# Patient Record
(demographics unavailable — no encounter records)

---

## 2025-02-12 NOTE — PHYSICAL EXAM
[FreeTextEntry1] : Healthy appearing 16-year-old child. Awake, alert, in no acute distress. Pleasant and cooperative.  Eyes are clear with no sclera abnormalities. External ears, nose and mouth are clear.  Good respiratory effort with no audible wheezing without use of a stethoscope. Ambulates independently with no evidence of antalgia. Good coordination and balance. Able to get on and off exam table without difficulty.   Examination of back: Right scapular prominence noted. very mild hip height discrepancy noted.   Neurological examination reveals a grade 5/5 muscle power. Sensation is intact to crude touch and pinprick. The plantars are bilaterally down going. The Sari test is negative.    There is no hairy patch, lipoma, sinus in the back. There is no pes cavus, asymmetry of calves, significant leg length discrepancy or significant cafe-au-lait spots.   Examination of both the upper and lower extremities: No obvious abnormalities. 5/5 muscle strength bilaterally. There is no gross deformity. Patient has full range of motion of both the hips, knees, ankles, wrists, elbows, and shoulders. Neck range of motion is full and free without any pain or spasm. Normal appearing fingers and toes. No large birthmarks noted. DTR's are intact.

## 2025-02-12 NOTE — DATA REVIEWED
[de-identified] : My interpretation and review of images taken today, 2/11/2025, in office:  AP/Lateral scoliosis x-rays OOB brace show demonstrates a right thoracic curve measuring 21.3 degrees. Thoracolumbar curve measuring 14 degrees. Relatively unchanged compared to OOB films Nov 2023. Risser 4+.  My interpretation and review of images taken today, 9/19/2024, in office:  AP/Lateral scoliosis x-rays OOB brace show demonstrates a right thoracic curve measuring 21.5 degrees. Thoracolumbar curve measuring 14 degrees. Relatively unchanged compared to OOB films Nov 2023. Risser 4+.  My interpretation and review of images taken today, 11/21/2023, in office: AP/Lateral scoliosis x-rays OOB brace show demonstrates a right thoracic curve measuring 23.8 degrees. Relatively unchanged compared to OOB films Feb 2023. Risser 4+.  My interpretation and review of images taken today, 02/28/2023, in office:  AP scoliosis x-rays in brace show appropriate correction, curve measures approximately 15 degrees in brace. Risser 4

## 2025-02-12 NOTE — ASSESSMENT
[FreeTextEntry1] : Quynh is a 17-year-old female with AIS, treated with pro-Chaneau brace fabricated by [employer]   The history was obtained today from the child and parent; given the patient's age, the history was unreliable and the parent was used as an independent historian. Clinical findings and x-ray results were reviewed at length with the patient and parent. We discussed at length the natural history, etiology, pathoanatomy and treatment modalities of scoliosis with patient and parent. Patient's scoliosis out of brace x-rays shows a right thoracic curve measuring 21.3 degrees; relatively unchanged from previous OOB films in September 2024. Patient is age 17 and Risser 4+. Ineffectiveness of bracing at skeletal maturity discussed. At this time, patient may continue brace weaning protocol. The brace will be worn for 8 hours every other day. It was discussed that the goal of bracing is not to correct curves but to minimize curve progression as patients are growing. PT script provided for [facility] PT, core and chest wall muscle strengthening and rebalancing; prescription was provided to family. She can continue to participate in activities as tolerated.   Follow-up recommended in my office in 6 months for clinical reassessment repeat OOB scoliosis x-rays. All questions and concerns were addressed today. Family verbalize understanding and agree with plan of care.  Yaneli Charlton MD, PGY5

## 2025-02-12 NOTE — HISTORY OF PRESENT ILLNESS
[FreeTextEntry1] : Quynh is a 17-year-old female who presents with her mother for follow up of scoliosis. A pro-Chaneau brace by [employer] was initiated on 8/18/20. She was last seen in my office in September 2024 where we initiated the brace weaning protocol.  She received this brace in May 2023. She is wearing the brace every other night for 6-8 hours and reports that it is still fitting her well. Mother expresses concern over asymmetry in the child's breasts/posterior rib prominence and would like to start [facility]. She denies any back pain, radiating pain, numbness, tingling sensations, discomfort, weakness to the LE, radiating LE pain, or bladder/bowel dysfunction. Patient has been participating in all of her normal physical activities without restrictions or discomfort. Menarche was July 2020. Here today for further management regarding the same.

## 2025-02-12 NOTE — END OF VISIT
[] : Resident [FreeTextEntry3] : I, Rip Roy MD, personally saw and evaluated the patient and developed the plan as documented above. I concur or have edited the note as appropriate.

## 2025-02-12 NOTE — ASSESSMENT
[FreeTextEntry1] : Quynh is a 17-year-old female with AIS, treated with pro-Chaneau brace fabricated by [employer]   The history was obtained today from the child and parent; given the patient's age, the history was unreliable and the parent was used as an independent historian. Clinical findings and x-ray results were reviewed at length with the patient and parent. We discussed at length the natural history, etiology, pathoanatomy and treatment modalities of scoliosis with patient and parent. Patient's scoliosis out of brace x-rays shows a right thoracic curve measuring 21.3 degrees; relatively unchanged from previous OOB films in September 2024. Patient is age 17 and Risser 4+. Ineffectiveness of bracing at skeletal maturity discussed. At this time, patient may continue brace weaning protocol. The brace will be worn for 8 hours every other day. It was discussed that the goal of bracing is not to correct curves but to minimize curve progression as patients are growing. PT script provided for [facility] PT, core and chest wall muscle strengthening and rebalancing; prescription was provided to family. She can continue to participate in activities as tolerated.   Follow-up recommended in my office in 6 months for clinical reassessment repeat OOB scoliosis x-rays. All questions and concerns were addressed today. Family verbalize understanding and agree with plan of care.  Yaneli Charlton MD, PGY5

## 2025-02-12 NOTE — REVIEW OF SYSTEMS
[Change in Activity] : no change in activity [Fever Above 102] : no fever [Malaise] : no malaise [Rash] : no rash [Nasal Stuffiness] : no nasal congestion [Sore Throat] : no sore throat [Cough] : no cough [Congestion] : no congestion [Change in Appetite] : no change in appetite [Limping] : no limping [Joint Pains] : no arthralgias [Joint Swelling] : no joint swelling [Back Pain] : ~T no back pain [Muscle Aches] : no muscle aches [Sleep Disturbances] : ~T no sleep disturbances

## 2025-02-12 NOTE — DATA REVIEWED
[de-identified] : My interpretation and review of images taken today, 2/11/2025, in office:  AP/Lateral scoliosis x-rays OOB brace show demonstrates a right thoracic curve measuring 21.3 degrees. Thoracolumbar curve measuring 14 degrees. Relatively unchanged compared to OOB films Nov 2023. Risser 4+.  My interpretation and review of images taken today, 9/19/2024, in office:  AP/Lateral scoliosis x-rays OOB brace show demonstrates a right thoracic curve measuring 21.5 degrees. Thoracolumbar curve measuring 14 degrees. Relatively unchanged compared to OOB films Nov 2023. Risser 4+.  My interpretation and review of images taken today, 11/21/2023, in office: AP/Lateral scoliosis x-rays OOB brace show demonstrates a right thoracic curve measuring 23.8 degrees. Relatively unchanged compared to OOB films Feb 2023. Risser 4+.  My interpretation and review of images taken today, 02/28/2023, in office:  AP scoliosis x-rays in brace show appropriate correction, curve measures approximately 15 degrees in brace. Risser 4   MEDICINE